# Patient Record
(demographics unavailable — no encounter records)

---

## 2024-12-09 NOTE — HISTORY OF PRESENT ILLNESS
[No] : Patient does not have concerns regarding sex [Currently Active] : currently active [Men] : men [Vaginal] : vaginal [TextBox_4] : Tolerating fosamx (since 3/2021) for osteopenia with elevated frax, due for bmd in january. takes vitd, not much exercise due to MS no vb [Mammogramdate] : 1/2024 [PapSmeardate] : 2018 [BoneDensityDate] : 1/2023 [ColonoscopyDate] : 3/2024 [TextBox_43] : fu 5 yrs

## 2024-12-09 NOTE — PHYSICAL EXAM
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

## 2025-03-27 NOTE — REVIEW OF SYSTEMS
[Fever] : no fever [Chest Pain] : no chest pain [Palpitations] : no palpitations [Lower Ext Edema] : no lower extremity edema [Shortness Of Breath] : no shortness of breath [Dyspnea on Exertion] : no dyspnea on exertion [Abdominal Pain] : no abdominal pain [Muscle Weakness] : muscle weakness [Skin Rash] : skin rash [Headache] : no headache [Dizziness] : no dizziness [Fainting] : no fainting

## 2025-03-27 NOTE — PHYSICAL EXAM
[No Acute Distress] : no acute distress [No JVD] : no jugular venous distention [Regular Rhythm] : with a regular rhythm [Normal S1, S2] : normal S1 and S2 [No Edema] : there was no peripheral edema [Grossly Normal Strength/Tone] : grossly normal strength/tone [No Focal Deficits] : no focal deficits [Alert and Oriented x3] : oriented to person, place, and time [de-identified] : 2/6 systolic murmur unchanged [de-identified] : Benign [de-identified] : No obvious irruption across upper chest

## 2025-03-27 NOTE — HISTORY OF PRESENT ILLNESS
[de-identified] : Presents for follow-up, to review recent fasting labs and prescription renewals. She has a history of hyperlipidemia, hypertension, aortic/mitral regurgitation, osteoporosis and multiple sclerosis. Follows with neurology and there has been no recent changes. Has been generally well otherwise without any recent illness.

## 2025-03-27 NOTE — HEALTH RISK ASSESSMENT
[No] : In the past 12 months have you used drugs other than those required for medical reasons? No [No falls in past year] : Patient reported no falls in the past year [0] : 2) Feeling down, depressed, or hopeless: Not at all (0) [PHQ-2 Negative - No further assessment needed] : PHQ-2 Negative - No further assessment needed [[id number]] : 0 [Never] : Never

## 2025-03-27 NOTE — ASSESSMENT
[FreeTextEntry1] : Her exam is unchanged.  Hyperlipidemia-most recent cholesterol reading from 9/24 was 184 with an LDL of 109. Continue Lipitor 10 mg daily.  Hypertension-blood pressure remains well-controlled. Continue valsartan 160 mg daily and triamterene/HCT TZ 37.5/25 daily.    History of multiple sclerosis-did have recent follow-up with neurology.   Does continue to use baclofen 10 mg as needed.